# Patient Record
(demographics unavailable — no encounter records)

---

## 2024-12-07 NOTE — DATA REVIEWED
[FreeTextEntry1] :  Left nodule benign FNA in 2014.  Thyroid sono 2018 RLP nodule 7x6x8 mm. Left MP nodule [id number] mm   Left nodule benign FNA in 2018  Thyroid sono 7/8/19 - RLP nodule 7x4x6 mm - stable. Left UP nodule [id number] mm - stable  Thyroid sono 10/07/2020 FINDINGS:Heterogeneous echogenicity is noted. Right 7 x 5 x 6 mm hyperechoic nodule in the lower pole which appears stable. Left  stable 2.6 x 1.3 x 2.0 cm hypoechoic circumscribed ovoid nodule in the lower pole. Cervical Lymph Nodes: No enlarged or abnormal morphology cervical nodes. IMPRESSION: Stable thyroid nodules as described above compared to 7/8/2019.  Thyroid sono 01/29/2021 FINDINGS:Heterogeneous Right stable solid nodule in the lower pole measuring 7 x 4 x 6 mm. Left  stable 2.6 x 1.3 x 2.0 cm solid and partially cystic nodule in the mid to upper pole. Cervical Lymph Nodes: No enlarged or abnormal morphology cervical nodes. IMPRESSION: Stable exam.  Thyroid sono 05/17/2022. FINDINGS:diffusely heterogeneous Right  lower pole nodule stable approximate 0.5 cm Left  upper pole isoechoic partly cystic 2.6 x 1.4 x 1.9 cm nodule unchanged. Cervical Lymph Nodes: No enlarged or abnormal morphology cervical nodes. IMPRESSION: Stable thyroid sonogram  Thyroid sono 04/03/2023 FINDINGS:heterogeneous  Right 0.5 x 0.5 cm isoechoic nodule in the lower pole, unchanged. (TIRAD-3). Left 2.5 x 2.0 x 1.2 cm heterogeneous predominantly isoechoic nodule in the mid to upper pole. There are no calcifications. (TIRAD -3). Not significantly changed. Cervical Lymph Nodes: No enlarged or abnormal morphology cervical nodes. IMPRESSION: Dominant left thyroid nodule, not significantly changed. Consider follow-up ultrasound in one year.TI-RAD 3: Mildly suspicious (FNA if > 2.5 cm, Follow if > 1.5 cm)  Thyroid sono 04/06/2024 FINDINGS:Normal in size. Heterogeneous in echotexture. Right  7 x 7 x 5 mm isoechoic nodule in the lower pole, not significantly changed (TIRAD -2). Left  2.5 x 2.2 x 1.3 cm heterogeneous predominantly isoechoic nodule in the mid to upper pole, not significantly changed. There are no calcifications. (TIRAD -3). Isthmus: 3 mm. Cervical Lymph Nodes: No enlarged or abnormal morphology cervical nodes. IMPRESSION Interval stability. Dominant left thyroid nodule, not significantly changed. Consider follow-up ultrasound in one year.  ============================================================== Labs 11/4/23 TSH 1.85, FT4 1.1 Labs 11/23/24 TSH 0.99 FT4 1.3

## 2024-12-07 NOTE — ASSESSMENT
[FreeTextEntry1] : 57 yr old female with 1. Hypothyroid - clinically euthyroid, normal TSH - cont LT4 88 mcg daily, rx sent, repeat labs 1 week before next visit  2. Bilateral thyroid nodules - Largest Left nodule with benign FNA x2 in past. She is asymptomatic. Stable nodules on sono 2024 - repeat sono 2025 to monitor size and appearance of nodules  3. palpitations - cardiac work up neg, PCP follow up if sx persist, increase hydration and PO water intake

## 2024-12-07 NOTE — HISTORY OF PRESENT ILLNESS
[FreeTextEntry1] : Interval hx - complains of palpitations, dizziness with deep breathing, saw PCP/cardio testing normal -holter monitor, echo, carotid sono, EKG but stress test not covered by insurance. denies anxiety.    Quality: hypothyroid Duration: 20+ years Onset: blood test Severity: mild Associated symptoms: . denies changes in bowel habits.  no voice hoarseness   MODIFYING FACTORS:  Current thyroid medication: Levothyroxine 88 mcg daily on empty stomach ------------------------------------------------------------------------------- Quality: bilateral thyroid nodules Duration: 2014 Onset: sonogram Severity: mild Associated symptoms: denies anterior neck pain, dysphagia or voice changes MODIFYING FACTORS:  Left nodule benign FNA in 2014. Left nodule benign FNA in 2018

## 2024-12-07 NOTE — REASON FOR VISIT
[Follow - Up] : a follow-up visit [Hypothyroidism] : hypothyroidism [Thyroid nodule/ MNG] : thyroid nodule/ MNG [Other: _____] : [unfilled] [[employer] ] : provided by [employer]   [Time Spent: ____ minutes] : Total time spent using  services: [unfilled] minutes. The patient's primary language is not English thus required  services. [Interpreters_IDNumber] : [id number] [Interpreters_FullName] : Zia [FreeTextEntry3] : [place]

## 2024-12-07 NOTE — REVIEW OF SYSTEMS
[Depression] : depression [As Noted in HPI] : as noted in HPI [Constipation] : no constipation [Diarrhea] : no diarrhea [Polyuria] : no polyuria [Nocturia] : no nocturia [Tremors] : no tremors [Cold Intolerance] : no cold intolerance [Heat Intolerance] : no heat intolerance [FreeTextEntry8] : menopause 48 yrs old

## 2025-02-28 NOTE — HEALTH RISK ASSESSMENT
[No] : No [1 or 2 (0 pts)] : 1 or 2 (0 points) [Never (0 pts)] : Never (0 points) [No falls in past year] : Patient reported no falls in the past year [Little interest or pleasure doing things] : 1) Little interest or pleasure doing things [Feeling down, depressed, or hopeless] : 2) Feeling down, depressed, or hopeless [0] : 2) Feeling down, depressed, or hopeless: Not at all (0) [PHQ-2 Negative - No further assessment needed] : PHQ-2 Negative - No further assessment needed [Never] : Never [de-identified] : none [de-identified] : regular [[id number]] : 0

## 2025-02-28 NOTE — ASSESSMENT
[FreeTextEntry1] : 59 y/o female with PMHx significant for HTN, Hypothyroid, lumbar spondylosis, nontoxic multinodular goiter, Obese, GERD, Hiatal hernia, HLD, Cardiac arrhythmia presenting to the office to establish care and c/o rash and itchiness, also worsening acid reflux  CVS: HTN, HLD, cardiac arrhythmia -Continue Flecainide 50 mg bid, Losartan HCT 50/12.5 mg daily -Continue follow up with Cardiology  ENDO: h/o Hypothyroid, multiple thyroid nodules -Continue Levothyroxine 88 mcg daily -Continue follow up with Dr Maldonado  DERM: Rash-Dermatology referral  GI: Hiatal hernia with GERD -Follow up with Dr Roberts -Start Famotidine 40 mg bid (continuation of therapy)  HCM:  -Last Mammogram 10/24 -Last PAP 8/24 Dr Denise Singh -Last Bone density 10/24- osteopenia -RTO for CPE

## 2025-02-28 NOTE — HEALTH RISK ASSESSMENT
[No] : No [1 or 2 (0 pts)] : 1 or 2 (0 points) [Never (0 pts)] : Never (0 points) [No falls in past year] : Patient reported no falls in the past year [Little interest or pleasure doing things] : 1) Little interest or pleasure doing things [Feeling down, depressed, or hopeless] : 2) Feeling down, depressed, or hopeless [0] : 2) Feeling down, depressed, or hopeless: Not at all (0) [PHQ-2 Negative - No further assessment needed] : PHQ-2 Negative - No further assessment needed [Never] : Never [de-identified] : none [de-identified] : regular [[id number]] : 0

## 2025-02-28 NOTE — PHYSICAL EXAM
[Normal] : soft, non-tender, non-distended, no masses palpated, no HSM and normal bowel sounds [No Rash] : no rash

## 2025-02-28 NOTE — HISTORY OF PRESENT ILLNESS
[FreeTextEntry8] : 57 y/o female with PMHx significant for HTN, Hypothyroid, lumbar spondylosis, nontoxic multinodular goiter, Obese, GERD, Hiatal hernia, HLD, Cardiac arrhythmia presenting to the office to establish care and c/o rash and itchiness, also worsening acid reflux

## 2025-02-28 NOTE — ASSESSMENT
[FreeTextEntry1] : 57 y/o female with PMHx significant for HTN, Hypothyroid, lumbar spondylosis, nontoxic multinodular goiter, Obese, GERD, Hiatal hernia, HLD, Cardiac arrhythmia presenting to the office to establish care and c/o rash and itchiness, also worsening acid reflux  CVS: HTN, HLD, cardiac arrhythmia -Continue Flecainide 50 mg bid, Losartan HCT 50/12.5 mg daily -Continue follow up with Cardiology  ENDO: h/o Hypothyroid, multiple thyroid nodules -Continue Levothyroxine 88 mcg daily -Continue follow up with Dr Maldonado  DERM: Rash-Dermatology referral  GI: Hiatal hernia with GERD -Follow up with Dr Roberts -Start Famotidine 40 mg bid (continuation of therapy)  HCM:  -Last Mammogram 10/24 -Last PAP 8/24 Dr Denise Singh -Last Bone density 10/24- osteopenia -RTO for CPE

## 2025-02-28 NOTE — HISTORY OF PRESENT ILLNESS
[FreeTextEntry8] : 59 y/o female with PMHx significant for HTN, Hypothyroid, lumbar spondylosis, nontoxic multinodular goiter, Obese, GERD, Hiatal hernia, HLD, Cardiac arrhythmia presenting to the office to establish care and c/o rash and itchiness, also worsening acid reflux

## 2025-03-27 NOTE — PHYSICAL EXAM
[No Rash] : no rash [Normal] : normal gait, coordination grossly intact, no focal deficits and deep tendon reflexes were 2+ and symmetric [de-identified] : There is a rash on the posterior aspect of the left knee laterally

## 2025-03-27 NOTE — ASSESSMENT
[FreeTextEntry1] : 59 y/o female with PMHx significant for HTN, Hypothyroid, lumbar spondylosis, nontoxic multinodular goiter, Obese, GERD, Hiatal hernia, HLD, Cardiac arrhythmia presenting to the office to establish care and c/o rash and itchiness, also worsening acid reflux  CVS: HTN, HLD, cardiac arrhythmia -Continue Flecainide 50 mg bid, Losartan HCT 50/12.5 mg daily -Continue follow up with Cardiology  ENDO: h/o Hypothyroid, multiple thyroid nodules -Continue Levothyroxine 88 mcg daily -Continue follow up with Dr Maldonado  DERM: Rash-Dermatology referral provided  GI: Hiatal hernia with GERD -Follow up with Dr Roberts -Continue with Famotidine 40 mg bid (continuation of therapy)  HCM:  -Last Mammogram 10/24 -Last PAP 8/24 Dr Denise Singh -Last Bone density 10/24- osteopenia -RTO for CPE

## 2025-03-27 NOTE — HEALTH RISK ASSESSMENT
[No] : No [1 or 2 (0 pts)] : 1 or 2 (0 points) [Never (0 pts)] : Never (0 points) [No falls in past year] : Patient reported no falls in the past year [Little interest or pleasure doing things] : 1) Little interest or pleasure doing things [Feeling down, depressed, or hopeless] : 2) Feeling down, depressed, or hopeless [0] : 2) Feeling down, depressed, or hopeless: Not at all (0) [PHQ-2 Negative - No further assessment needed] : PHQ-2 Negative - No further assessment needed [Never] : Never [With Patient/Caregiver] : , with patient/caregiver [Reviewed updated] : Reviewed, updated [Aggressive treatment] : aggressive treatment [de-identified] : none [de-identified] : regular [[id number]] : 0 [AdvancecareDate] : 03/25

## 2025-03-27 NOTE — HISTORY OF PRESENT ILLNESS
[FreeTextEntry1] : Follow up [de-identified] : 57 y/o female with PMHx significant for HTN, Hypothyroid, lumbar spondylosis, nontoxic multinodular goiter, Obese, GERD, Hiatal hernia, HLD, Cardiac arrhythmia presenting to the office c/o rash and itchiness in different parts of the body

## 2025-03-27 NOTE — ASSESSMENT
[FreeTextEntry1] : 57 y/o female with PMHx significant for HTN, Hypothyroid, lumbar spondylosis, nontoxic multinodular goiter, Obese, GERD, Hiatal hernia, HLD, Cardiac arrhythmia presenting to the office to establish care and c/o rash and itchiness, also worsening acid reflux  CVS: HTN, HLD, cardiac arrhythmia -Continue Flecainide 50 mg bid, Losartan HCT 50/12.5 mg daily -Continue follow up with Cardiology  ENDO: h/o Hypothyroid, multiple thyroid nodules -Continue Levothyroxine 88 mcg daily -Continue follow up with Dr Maldonado  DERM: Rash-Dermatology referral provided  GI: Hiatal hernia with GERD -Follow up with Dr Roberts -Continue with Famotidine 40 mg bid (continuation of therapy)  HCM:  -Last Mammogram 10/24 -Last PAP 8/24 Dr Denise Singh -Last Bone density 10/24- osteopenia -RTO for CPE

## 2025-03-27 NOTE — HEALTH RISK ASSESSMENT
[No] : No [1 or 2 (0 pts)] : 1 or 2 (0 points) [Never (0 pts)] : Never (0 points) [No falls in past year] : Patient reported no falls in the past year [Little interest or pleasure doing things] : 1) Little interest or pleasure doing things [Feeling down, depressed, or hopeless] : 2) Feeling down, depressed, or hopeless [0] : 2) Feeling down, depressed, or hopeless: Not at all (0) [PHQ-2 Negative - No further assessment needed] : PHQ-2 Negative - No further assessment needed [Never] : Never [With Patient/Caregiver] : , with patient/caregiver [Reviewed updated] : Reviewed, updated [Aggressive treatment] : aggressive treatment [de-identified] : none [de-identified] : regular [[id number]] : 0 [AdvancecareDate] : 03/25

## 2025-03-27 NOTE — PHYSICAL EXAM
[No Rash] : no rash [Normal] : normal gait, coordination grossly intact, no focal deficits and deep tendon reflexes were 2+ and symmetric [de-identified] : There is a rash on the posterior aspect of the left knee laterally

## 2025-03-27 NOTE — HISTORY OF PRESENT ILLNESS
[FreeTextEntry1] : Follow up [de-identified] : 57 y/o female with PMHx significant for HTN, Hypothyroid, lumbar spondylosis, nontoxic multinodular goiter, Obese, GERD, Hiatal hernia, HLD, Cardiac arrhythmia presenting to the office c/o rash and itchiness in different parts of the body

## 2025-06-04 NOTE — ASSESSMENT
[FreeTextEntry1] : 57 y/o female with PMHx significant for HTN, Hypothyroid, lumbar spondylosis, nontoxic multinodular goiter, Obese, GERD, Hiatal hernia, HLD, Cardiac arrhythmia presenting to the office to establish care and c/o right groin pain.  MSK / GI: Pelvic pain / groin pain -US Pelvis, CT scan Pelvis ordered -OTC NSAIDs for pain control  CVS: HTN, HLD, cardiac arrhythmia, palpitations -Continue Flecainide 50 mg bid, Losartan HCT 50/12.5 mg daily -Continue follow up with Cardiology  ENDO: h/o Hypothyroid, multiple thyroid nodules -Continue Levothyroxine 88 mcg daily -Continue follow up with Dr Maldonado  DERM: Rash-Dermatology referral provided  GI: Hiatal hernia with GERD -Follow up with Dr Roberts -Continue with Famotidine 40 mg bid (continuation of therapy)  HCM:  -Last Mammogram 10/24 -Last PAP 8/24 Dr Denise Singh -Last Bone density 10/24- osteopenia -RTO for CPE

## 2025-06-04 NOTE — PHYSICAL EXAM
[Normal] : normal gait, coordination grossly intact, no focal deficits and deep tendon reflexes were 2+ and symmetric [de-identified] : tenderness on the right groin / pelvic area to palpation, no masses. [de-identified] : There is a rash on the posterior aspect of the left knee laterally

## 2025-06-04 NOTE — HEALTH RISK ASSESSMENT
[No] : No [1 or 2 (0 pts)] : 1 or 2 (0 points) [Never (0 pts)] : Never (0 points) [No falls in past year] : Patient reported no falls in the past year [Little interest or pleasure doing things] : 1) Little interest or pleasure doing things [Feeling down, depressed, or hopeless] : 2) Feeling down, depressed, or hopeless [0] : 2) Feeling down, depressed, or hopeless: Not at all (0) [PHQ-2 Negative - No further assessment needed] : PHQ-2 Negative - No further assessment needed [With Patient/Caregiver] : , with patient/caregiver [Reviewed updated] : Reviewed, updated [Aggressive treatment] : aggressive treatment [Never] : Never [de-identified] : none [de-identified] : regular [[id number]] : 0 [AdvancecareDate] : 03/25

## 2025-06-04 NOTE — HISTORY OF PRESENT ILLNESS
[FreeTextEntry1] : Follow up [de-identified] : 59 y/o female with PMHx significant for HTN, Hypothyroid, lumbar spondylosis, nontoxic multinodular goiter, Obese, GERD, Hiatal hernia, HLD, Cardiac arrhythmia presenting to the office c/o worsening RIGHT groin pain which has been going on for many years. Pt states that the pain is a sharp pain that last only a few seconds rated8/10 when worse, comes and goes throughout the day. Pt states that she her GYN saw her last year and told her that the pain was not GYN related.

## 2025-06-04 NOTE — PHYSICAL EXAM
[Normal] : normal gait, coordination grossly intact, no focal deficits and deep tendon reflexes were 2+ and symmetric [de-identified] : tenderness on the right groin / pelvic area to palpation, no masses. [de-identified] : There is a rash on the posterior aspect of the left knee laterally

## 2025-06-04 NOTE — REVIEW OF SYSTEMS
[Heartburn] : heartburn [Itching] : Itching [Skin Rash] : skin rash [Negative] : Neurological [Abdominal Pain] : abdominal pain [FreeTextEntry7] : low abdominal pain

## 2025-06-04 NOTE — HISTORY OF PRESENT ILLNESS
[FreeTextEntry1] : Follow up [de-identified] : 59 y/o female with PMHx significant for HTN, Hypothyroid, lumbar spondylosis, nontoxic multinodular goiter, Obese, GERD, Hiatal hernia, HLD, Cardiac arrhythmia presenting to the office c/o worsening RIGHT groin pain which has been going on for many years. Pt states that the pain is a sharp pain that last only a few seconds rated8/10 when worse, comes and goes throughout the day. Pt states that she her GYN saw her last year and told her that the pain was not GYN related.

## 2025-06-04 NOTE — HEALTH RISK ASSESSMENT
[No] : No [1 or 2 (0 pts)] : 1 or 2 (0 points) [Never (0 pts)] : Never (0 points) [No falls in past year] : Patient reported no falls in the past year [Little interest or pleasure doing things] : 1) Little interest or pleasure doing things [Feeling down, depressed, or hopeless] : 2) Feeling down, depressed, or hopeless [0] : 2) Feeling down, depressed, or hopeless: Not at all (0) [PHQ-2 Negative - No further assessment needed] : PHQ-2 Negative - No further assessment needed [With Patient/Caregiver] : , with patient/caregiver [Reviewed updated] : Reviewed, updated [Aggressive treatment] : aggressive treatment [Never] : Never [de-identified] : none [de-identified] : regular [[id number]] : 0 [AdvancecareDate] : 03/25

## 2025-06-04 NOTE — ASSESSMENT
[FreeTextEntry1] : 59 y/o female with PMHx significant for HTN, Hypothyroid, lumbar spondylosis, nontoxic multinodular goiter, Obese, GERD, Hiatal hernia, HLD, Cardiac arrhythmia presenting to the office to establish care and c/o right groin pain.  MSK / GI: Pelvic pain / groin pain -US Pelvis, CT scan Pelvis ordered -OTC NSAIDs for pain control  CVS: HTN, HLD, cardiac arrhythmia, palpitations -Continue Flecainide 50 mg bid, Losartan HCT 50/12.5 mg daily -Continue follow up with Cardiology  ENDO: h/o Hypothyroid, multiple thyroid nodules -Continue Levothyroxine 88 mcg daily -Continue follow up with Dr Maldonado  DERM: Rash-Dermatology referral provided  GI: Hiatal hernia with GERD -Follow up with Dr Roberts -Continue with Famotidine 40 mg bid (continuation of therapy)  HCM:  -Last Mammogram 10/24 -Last PAP 8/24 Dr Denise Singh -Last Bone density 10/24- osteopenia -RTO for CPE

## 2025-06-07 NOTE — REVIEW OF SYSTEMS
[As Noted in HPI] : as noted in HPI [Constipation] : no constipation [Diarrhea] : no diarrhea [Tremors] : no tremors [Cold Intolerance] : no cold intolerance [Heat Intolerance] : no heat intolerance [FreeTextEntry2] : weight stables [FreeTextEntry8] : menopause 48 yrs old

## 2025-06-07 NOTE — REASON FOR VISIT
Resident/Fellow [Follow - Up] : a follow-up visit [Hypothyroidism] : hypothyroidism [Thyroid nodule/ MNG] : thyroid nodule/ MNG [Other: _____] : [unfilled]

## 2025-06-07 NOTE — ASSESSMENT
[FreeTextEntry1] : 58 yr old female with 1. Hypothyroid - clinically euthyroid, normal TSH - cont LT4 88 mcg daily, repeat labs 1 week before next visit - hair loss not due to thyroid ,PCP follow up advised  2. Bilateral thyroid nodules - Largest Left nodule with benign FNA x2 in past. She is asymptomatic. Overall stable nodules on sono 2025 - repeat sono 2026 to monitor size and appearance of nodules

## 2025-06-07 NOTE — DATA REVIEWED
[FreeTextEntry1] :  Left nodule benign FNA in 2014.  Thyroid sono 2018 RLP nodule 7x6x8 mm. Left MP nodule [id number] mm   Left nodule benign FNA in 2018  Thyroid sono 7/8/19 - RLP nodule 7x4x6 mm - stable. Left UP nodule [id number] mm - stable  Thyroid sono 10/07/2020 FINDINGS:Heterogeneous echogenicity is noted. Right 7 x 5 x 6 mm hyperechoic nodule in the lower pole which appears stable. Left  stable 2.6 x 1.3 x 2.0 cm hypoechoic circumscribed ovoid nodule in the lower pole. Cervical Lymph Nodes: No enlarged or abnormal morphology cervical nodes. IMPRESSION: Stable thyroid nodules as described above compared to 7/8/2019.  Thyroid sono 01/29/2021 FINDINGS:Heterogeneous Right stable solid nodule in the lower pole measuring 7 x 4 x 6 mm. Left  stable 2.6 x 1.3 x 2.0 cm solid and partially cystic nodule in the mid to upper pole. Cervical Lymph Nodes: No enlarged or abnormal morphology cervical nodes. IMPRESSION: Stable exam.  Thyroid sono 05/17/2022. FINDINGS:diffusely heterogeneous Right  lower pole nodule stable approximate 0.5 cm Left  upper pole isoechoic partly cystic 2.6 x 1.4 x 1.9 cm nodule unchanged. Cervical Lymph Nodes: No enlarged or abnormal morphology cervical nodes. IMPRESSION: Stable thyroid sonogram  Thyroid sono 04/03/2023 FINDINGS:heterogeneous  Right 0.5 x 0.5 cm isoechoic nodule in the lower pole, unchanged. (TIRAD-3). Left 2.5 x 2.0 x 1.2 cm heterogeneous predominantly isoechoic nodule in the mid to upper pole. There are no calcifications. (TIRAD -3). Not significantly changed. Cervical Lymph Nodes: No enlarged or abnormal morphology cervical nodes. IMPRESSION: Dominant left thyroid nodule, not significantly changed. Consider follow-up ultrasound in one year.TI-RAD 3: Mildly suspicious (FNA if > 2.5 cm, Follow if > 1.5 cm)  Thyroid sono 04/06/2024 FINDINGS:Normal in size. Heterogeneous in echotexture. Right  7 x 7 x 5 mm isoechoic nodule in the lower pole, not significantly changed (TIRAD -2). Left  2.5 x 2.2 x 1.3 cm heterogeneous predominantly isoechoic nodule in the mid to upper pole, not significantly changed. There are no calcifications. (TIRAD -3). Isthmus: 3 mm. Cervical Lymph Nodes: No enlarged or abnormal morphology cervical nodes. IMPRESSION Interval stability. Dominant left thyroid nodule, not significantly changed. Consider follow-up ultrasound in one year.  Thyroid sonogram 5/12/25 RMP nodule 7x6x6 mm TR3 stable LMP nodule [id number] mm decreased TR3 (benign FNA in 2014 and 2018)  ============================================================== Labs 11/4/23 TSH 1.85, FT4 1.1 Labs 11/23/24 TSH 0.99 FT4 1.3

## 2025-06-07 NOTE — HISTORY OF PRESENT ILLNESS
[FreeTextEntry1] : Interval hx - complains of hair loss in shower  son at visit to help translate  Quality: hypothyroid Duration: 20+ years Onset: blood test Severity: mild  MODIFYING FACTORS: improved with meds Current thyroid medication: Levothyroxine 88 mcg daily on empty stomach ------------------------------------------------------------------------------- Quality: bilateral thyroid nodules Duration: 2014 Onset: sonogram Severity: mild Associated symptoms: denies anterior neck pain, dysphagia or voice changes MODIFYING FACTORS:  Left nodule benign FNA in 2014. Left nodule benign FNA in 2018

## 2025-06-07 NOTE — PHYSICAL EXAM
[Alert] : alert [No Acute Distress] : no acute distress [EOMI] : extra ocular movement intact [No LAD] : no lymphadenopathy [Normal Rate and Effort] : normal respiratory rate and effort [Clear to Auscultation] : lungs were clear to auscultation bilaterally [Normal S1, S2] : normal S1 and S2 [Normal Rate] : heart rate was normal [Normal Reflexes] : deep tendon reflexes were 2+ and symmetric [No Tremors] : no tremors [Oriented x3] : oriented to person, place, and time [Normal Affect] : the affect was normal [Normal Insight/Judgement] : insight and judgment were intact [Normal Mood] : the mood was normal [de-identified] : Left thyroid nodule

## 2025-06-24 NOTE — ASSESSMENT
[FreeTextEntry1] : 59 y/o female with PMHx significant for HTN, Hypothyroid, lumbar spondylosis, nontoxic multinodular goiter, Obese, GERD, Hiatal hernia, HLD, Cardiac arrhythmia presenting to the office to establish care and c/o right groin pain.  MSK: Lower back pain -Start Meloxicam 15 mg daily, cyclobenzaprine 5 mg at bedtime. -L/S spine Xray ordered with bend views.  CVS: HTN, HLD, cardiac arrhythmia, palpitations -Continue Flecainide 50 mg bid, Losartan HCT 50/12.5 mg daily -Continue follow up with Cardiology  ENDO: h/o Hypothyroid, multiple thyroid nodules -Continue Levothyroxine 88 mcg daily -Continue follow up with Dr Maldonado  GI: Hiatal hernia with GERD -Follow up with Dr Roberts -Continue with Famotidine 40 mg bid (continuation of therapy)  HCM:  -Last Mammogram 10/24 -Last PAP 8/24 Dr Denise Singh -Last Bone density 10/24- osteopenia -RTO for CPE

## 2025-06-24 NOTE — HISTORY OF PRESENT ILLNESS
[FreeTextEntry8] : 59 y/o female with PMHx significant for HTN, Hypothyroid, lumbar spondylosis, nontoxic multinodular goiter, Obese, GERD, Hiatal hernia, HLD, Cardiac arrhythmia presenting to the office s/p [facility] visit x 2 for lower back pain, had been given Cyclobenzaoprine with good relief, run out of medication and continues to have pain.

## 2025-06-24 NOTE — ASSESSMENT
[FreeTextEntry1] : 57 y/o female with PMHx significant for HTN, Hypothyroid, lumbar spondylosis, nontoxic multinodular goiter, Obese, GERD, Hiatal hernia, HLD, Cardiac arrhythmia presenting to the office to establish care and c/o right groin pain.  MSK: Lower back pain -Start Meloxicam 15 mg daily, cyclobenzaprine 5 mg at bedtime. -L/S spine Xray ordered with bend views.  CVS: HTN, HLD, cardiac arrhythmia, palpitations -Continue Flecainide 50 mg bid, Losartan HCT 50/12.5 mg daily -Continue follow up with Cardiology  ENDO: h/o Hypothyroid, multiple thyroid nodules -Continue Levothyroxine 88 mcg daily -Continue follow up with Dr Maldonado  GI: Hiatal hernia with GERD -Follow up with Dr Roberts -Continue with Famotidine 40 mg bid (continuation of therapy)  HCM:  -Last Mammogram 10/24 -Last PAP 8/24 Dr Denise Singh -Last Bone density 10/24- osteopenia -RTO for CPE

## 2025-06-24 NOTE — HISTORY OF PRESENT ILLNESS
[FreeTextEntry8] : 59 y/o female with PMHx significant for HTN, Hypothyroid, lumbar spondylosis, nontoxic multinodular goiter, Obese, GERD, Hiatal hernia, HLD, Cardiac arrhythmia presenting to the office s/p [facility] visit x 2 for lower back pain, had been given Cyclobenzaoprine with good relief, run out of medication and continues to have pain.

## 2025-06-24 NOTE — PHYSICAL EXAM
[No CVA Tenderness] : no CVA  tenderness [No Spinal Tenderness] : no spinal tenderness [Normal] : normal gait, coordination grossly intact, no focal deficits and deep tendon reflexes were 2+ and symmetric [Kyphosis] : no kyphosis [de-identified] :  spasm of lumbar paraspinal musculature

## 2025-06-24 NOTE — PHYSICAL EXAM
[No CVA Tenderness] : no CVA  tenderness [No Spinal Tenderness] : no spinal tenderness [Normal] : normal gait, coordination grossly intact, no focal deficits and deep tendon reflexes were 2+ and symmetric [Kyphosis] : no kyphosis [de-identified] :  spasm of lumbar paraspinal musculature

## 2025-06-25 NOTE — PHYSICAL EXAM
[de-identified] : Lumbar Exam  CONSTITUTIONAL: The patient is a very pleasant individual who is well-nourished and who appears stated age. PSYCHIATRIC: The patient is alert and oriented X 3 and in no apparent distress, and participates with orthopedic evaluation well. HEAD: Atraumatic and is nonsyndromic in appearance. EENT: No visible thyromegaly, EOMI. RESPIRATORY: Respiratory rate is regular, not dyspneic on examination. LYMPHATICS: There is no inguinal lymphadenopathy INTEGUMENTARY: Skin is clean, dry, and intact about the bilateral lower extremities and lumbar spine. VASCULAR: There is brisk capillary refill about the bilateral lower extremities. NEUROLOGIC: There are no pathologic reflexes. There is no decrease in sensation of the bilateral lower extremities on Wartenberg pinwheel examination. Deep tendon reflexes are well maintained at 2+/4 of the bilateral lower extremities and are symmetric. MUSCULOSKELETAL: There is no visible muscular atrophy. Manual motor strength is well maintained in the bilateral lower extremities. Range of motion of lumbar spine is well maintained. The patient ambulates in a non-myelopathic manner. Negative tension sign and straight leg raise bilaterally. Quad extension, ankle dorsiflexion, EHL, plantar flexion, and ankle eversion are well preserved. Normal secondary orthopaedic exam of bilateral hips, greater trochanteric area, knees and ankles.  Mechanically oriented lower back pain.  Mild tenderness about the left lumbar paraspinal musculature. Tenderness palpation along the ulnar aspect of the wrist/TFCC. [de-identified] : X-ray 6 views of the lumbar spine obtained at [facility] on 6/16/2025 and reviewed in the office today demonstrates lumbar spondylosis worse at L4-L5 L5-S1.  There is a slight retrolisthesis noted L4-L5.  No acute osseous abnormalities noted.  Pedicles are well-visualized.

## 2025-06-25 NOTE — DISCUSSION/SUMMARY
[Medication Risks Reviewed] : Medication risks reviewed [de-identified] : 58 year old female presents with symptoms suggestive of lumbar spondylosis, left ulnar-sided wrist pain.  35 minutes was spent reviewing the x-rays as well as discussing with the patient their clinical presentation, diagnosis and providing education. Conservative treatment was discussed with the patient at length. Anticipatory guidance regarding disease process, avoidance of acute exacerbation this was discussed at length and all patients commenting concerns were answered to the patient's satisfaction. Physical therapy for decrease pain and increase function was ordered. The patient was given home exercises as approved by [employer] and directed toward this particular process. Intermittent use of acetaminophen 500 mg 2 tablets t.i.d. p.r.n. mild to moderate pain.can continue with meloxicam 15 mg daily as needed with food or milk if there is no medical contraindication was also discussed. Home exercise including stretching on a daily basis for 20-30 minutes was recommended. Heat, ice, topical were discussed as needed. The patient will followup in 6-8 weeks at which point in time if symptoms continue we will order MRI studies to guide treatment plan including possible injection therapy with pain management versus surgical option.  If wrist pain persist, we can consider referral to our hand department for consideration of injections as needed.  All questions and concerns were addressed with the patient and they are in agreement with this plan.  This note was generated using dragon medical dictation software. A reasonable effort has been made for proofreading its contents, but typos may still remain. If there are any questions or points of clarification needed please notify my office.

## 2025-06-25 NOTE — HISTORY OF PRESENT ILLNESS
[de-identified] : 58-year-old female present today for evaluation of lower back pain.  Patient states her symptoms began approximately 1 month ago without any type of trauma.  Described as dull at times sharp pain in the lower lumbar spine, left-sided.  She was feeling some radiation into the left gluteal region and left lateral hip.  Patient states her symptoms are significantly severe for x 2 weeks ultimately requiring ED visit at [facility].  She states no acute findings were found she was discharged with anti-inflammatories and muscle relaxers.  She presents seen at urgent care and subsequently by PCP who prescribed her meloxicam she states has been helpful in managing her symptoms over the last week.  She was prescribed physical therapy after recent x-rays but has not started yet.  She now states there is no more radiating pain.  No numbness tingling weakness bilateral lower extremities.  Patient is also complaining of left wrist ulnar-sided wrist pain.  States that it is worse with holding heavy objects.  Also some pain with range of motion of the wrist.  No radiating pain no numbness tingling weakness in the left hand.  She did buy an over-the-counter brace which has been helpful at managing some of her pain.

## 2025-06-25 NOTE — REASON FOR VISIT
[Follow-Up Visit] : a follow-up visit for [Back Pain] : back pain [Interpreters_IDNumber] : [id number] [Interpreters_FullName] : Jose Manuel [[id number]] : Fijian